# Patient Record
Sex: FEMALE | URBAN - METROPOLITAN AREA
[De-identification: names, ages, dates, MRNs, and addresses within clinical notes are randomized per-mention and may not be internally consistent; named-entity substitution may affect disease eponyms.]

---

## 2021-06-10 ENCOUNTER — OFFICE VISIT (OUTPATIENT)
Dept: URBAN - METROPOLITAN AREA CLINIC 115 | Facility: CLINIC | Age: 51
End: 2021-06-10
Payer: COMMERCIAL

## 2021-06-10 ENCOUNTER — DASHBOARD ENCOUNTERS (OUTPATIENT)
Age: 51
End: 2021-06-10

## 2021-06-10 ENCOUNTER — LAB OUTSIDE AN ENCOUNTER (OUTPATIENT)
Dept: URBAN - METROPOLITAN AREA CLINIC 115 | Facility: CLINIC | Age: 51
End: 2021-06-10

## 2021-06-10 ENCOUNTER — WEB ENCOUNTER (OUTPATIENT)
Dept: URBAN - METROPOLITAN AREA CLINIC 115 | Facility: CLINIC | Age: 51
End: 2021-06-10

## 2021-06-10 DIAGNOSIS — R10.84 ABDOMINAL CRAMPING, GENERALIZED: ICD-10-CM

## 2021-06-10 DIAGNOSIS — R14.0 BLOATING: ICD-10-CM

## 2021-06-10 PROBLEM — 60728008 BLOATING: Status: ACTIVE | Noted: 2021-06-10

## 2021-06-10 PROCEDURE — G8417 CALC BMI ABV UP PARAM F/U: HCPCS | Performed by: INTERNAL MEDICINE

## 2021-06-10 PROCEDURE — G8427 DOCREV CUR MEDS BY ELIG CLIN: HCPCS | Performed by: INTERNAL MEDICINE

## 2021-06-10 PROCEDURE — G9906 PT RECV TBCO CESS INTERV: HCPCS | Performed by: INTERNAL MEDICINE

## 2021-06-10 PROCEDURE — 4004F PT TOBACCO SCREEN RCVD TLK: CPT | Performed by: INTERNAL MEDICINE

## 2021-06-10 PROCEDURE — G9902 PT SCRN TBCO AND ID AS USER: HCPCS | Performed by: INTERNAL MEDICINE

## 2021-06-10 PROCEDURE — 99204 OFFICE O/P NEW MOD 45 MIN: CPT | Performed by: INTERNAL MEDICINE

## 2021-06-10 RX ORDER — PANTOPRAZOLE SODIUM 40 MG/1
1 TABLET TABLET, DELAYED RELEASE ORAL ONCE A DAY
Qty: 90 | Refills: 1 | OUTPATIENT
Start: 2021-06-10

## 2021-06-10 RX ORDER — DICYCLOMINE HYDROCHLORIDE 10 MG/1
1 TABLET CAPSULE ORAL THREE TIMES A DAY
Qty: 90 | Refills: 1 | OUTPATIENT
Start: 2021-06-10 | End: 2021-08-09

## 2021-06-10 NOTE — PHYSICAL EXAM GASTROINTESTINAL
Abdomen , soft, epigastric tenderness, nondistended , no guarding or rigidity , no masses palpable , normal bowel sounds , Liver and Spleen , no hepatomegaly present , no hepatosplenomegaly , liver nontender , spleen not palpable

## 2021-06-11 PROBLEM — 21522001 ABDOMINAL PAIN: Status: ACTIVE | Noted: 2021-06-10

## 2021-06-11 LAB
A/G RATIO: 1.8
ALBUMIN: 4.4
ALKALINE PHOSPHATASE: 71
ALT (SGPT): 13
AST (SGOT): 13
BASO (ABSOLUTE): 0
BASOS: 0
BILIRUBIN, TOTAL: 0.3
BUN/CREATININE RATIO: 19
BUN: 12
CALCIUM: 9.5
CARBON DIOXIDE, TOTAL: 26
CHLORIDE: 104
CREATININE: 0.62
EGFR IF AFRICN AM: 121
EGFR IF NONAFRICN AM: 105
EOS (ABSOLUTE): 0.1
EOS: 2
GLOBULIN, TOTAL: 2.5
GLUCOSE: 84
HEMATOCRIT: 34.6
HEMATOLOGY COMMENTS:: (no result)
HEMOGLOBIN: 10.4
IMMATURE CELLS: (no result)
IMMATURE GRANS (ABS): 0
IMMATURE GRANULOCYTES: 0
LYMPHS (ABSOLUTE): 2
LYMPHS: 31
MCH: 24.4
MCHC: 30.1
MCV: 81
MONOCYTES(ABSOLUTE): 0.5
MONOCYTES: 7
NEUTROPHILS (ABSOLUTE): 3.9
NEUTROPHILS: 60
NRBC: (no result)
PLATELETS: 353
POTASSIUM: 4
PROTEIN, TOTAL: 6.9
RBC: 4.26
RDW: 15.3
SODIUM: 142
WBC: 6.5

## 2021-07-16 ENCOUNTER — OFFICE VISIT (OUTPATIENT)
Dept: URBAN - METROPOLITAN AREA SURGERY CENTER 13 | Facility: SURGERY CENTER | Age: 51
End: 2021-07-16

## 2023-05-31 NOTE — HPI-TODAY'S VISIT:
May 31, 2023     Patient: Jessica Baker   YOB: 1964   Date of Visit: 5/31/2023       To Whom it May Concern:    Jessica Baker was seen in my clinic on 5/31/2023 at 9:00 am.    Please excuse Jessica for her absence from work on the date listed above to be able to make her appointment.    Sincerely,         Long Xavier MD    Medical information is confidential and cannot be disclosed without the written consent of the patient or her representative.     06/10/2021 Patient is a 50 year old   female who presents on self-referral for evaluation of acid reflux, dyspepsia, and gas. Patient c/o epigastric pain and bloating for the past week. Denies constipation and diarrhea. Her bowel movements occur routinely every morning, but sometimes with loose stool, almost diarrhea. She did have a bowel movement this morning. Patient does not take NSAIDs. Denies melena or hematocheiza. Patient admits experiencing excess stress recently. Patient reports that when she sleeps facing down, she experiences more pain upon waking in the morning. Patient says she had two C-sections and gastric sleeve surgery in the past. She also says she has been bruising very easily.

## 2025-06-10 ENCOUNTER — OFFICE VISIT (OUTPATIENT)
Dept: URBAN - METROPOLITAN AREA CLINIC 23 | Facility: CLINIC | Age: 55
End: 2025-06-10

## 2025-06-13 ENCOUNTER — OFFICE VISIT (OUTPATIENT)
Dept: URBAN - METROPOLITAN AREA CLINIC 23 | Facility: CLINIC | Age: 55
End: 2025-06-13

## 2025-07-01 ENCOUNTER — OFFICE VISIT (OUTPATIENT)
Dept: URBAN - METROPOLITAN AREA CLINIC 23 | Facility: CLINIC | Age: 55
End: 2025-07-01